# Patient Record
Sex: MALE | Race: BLACK OR AFRICAN AMERICAN | NOT HISPANIC OR LATINO | URBAN - METROPOLITAN AREA
[De-identification: names, ages, dates, MRNs, and addresses within clinical notes are randomized per-mention and may not be internally consistent; named-entity substitution may affect disease eponyms.]

---
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
## 2022-05-19 NOTE — ED PEDIATRIC TRIAGE NOTE - ACCOMPANIED BY
Problem: Pain (Postpartum Vaginal Delivery)  Goal: Acceptable Pain Control  Outcome: Ongoing, Progressing     Problem: Bleeding (Postpartum Vaginal Delivery)  Goal: Hemostasis  Outcome: Ongoing, Progressing   Patient doing well.  Vital signs stable.  Fundus firm, moderate locia, has voided.  Has multiple small hemorrhoids, non-edematous.  Ambulating in room.  Caring for self and infant independently.  Denies any pain.  Will continue to monitor patient status and pain.   Parent

## 2022-06-10 NOTE — ED PROVIDER NOTE - CARDIAC, MLM
Diagnosis/Prognosis/MOLST Discussed/Treatment Options Normal rate, regular rhythm.  Heart sounds S1, S2.